# Patient Record
Sex: MALE | Race: WHITE | Employment: OTHER | URBAN - METROPOLITAN AREA
[De-identification: names, ages, dates, MRNs, and addresses within clinical notes are randomized per-mention and may not be internally consistent; named-entity substitution may affect disease eponyms.]

---

## 2020-01-21 ENCOUNTER — HOSPITAL ENCOUNTER (OUTPATIENT)
Age: 75
Discharge: HOME | End: 2020-01-23
Payer: MEDICARE

## 2020-01-21 PROCEDURE — 87186 SC STD MICRODIL/AGAR DIL: CPT

## 2020-01-21 PROCEDURE — 87205 SMEAR GRAM STAIN: CPT

## 2020-01-21 PROCEDURE — 87075 CULTR BACTERIA EXCEPT BLOOD: CPT

## 2020-01-21 PROCEDURE — 87070 CULTURE OTHR SPECIMN AEROBIC: CPT

## 2020-01-22 LAB
GRAM STAIN ORDERABLE: NORMAL

## 2020-01-23 LAB
ANAEROBIC CULTURE: NORMAL

## 2020-01-24 LAB
ORGANISM: ABNORMAL
WOUND/ABSCESS: ABNORMAL

## 2020-01-27 RX ORDER — HYDROCHLOROTHIAZIDE 25 MG/1
25 TABLET ORAL DAILY
COMMUNITY
End: 2020-03-16 | Stop reason: ALTCHOICE

## 2020-01-27 RX ORDER — ATENOLOL 100 MG/1
100 TABLET ORAL 2 TIMES DAILY
COMMUNITY

## 2020-01-27 RX ORDER — DICLOFENAC SODIUM 75 MG/1
75 TABLET, DELAYED RELEASE ORAL DAILY
COMMUNITY

## 2020-01-27 RX ORDER — FERROUS SULFATE 325(65) MG
325 TABLET ORAL
COMMUNITY

## 2020-01-27 RX ORDER — M-VIT,TX,IRON,MINS/CALC/FOLIC 27MG-0.4MG
1 TABLET ORAL DAILY
COMMUNITY
End: 2020-01-27

## 2020-01-27 RX ORDER — TAMSULOSIN HYDROCHLORIDE 0.4 MG/1
0.4 CAPSULE ORAL DAILY
COMMUNITY
End: 2020-01-27

## 2020-01-27 RX ORDER — VALACYCLOVIR HYDROCHLORIDE 1 G/1
1000 TABLET, FILM COATED ORAL 3 TIMES DAILY
COMMUNITY
End: 2020-01-27 | Stop reason: ALTCHOICE

## 2020-01-27 RX ORDER — ASPIRIN 81 MG/1
81 TABLET ORAL DAILY
COMMUNITY
End: 2020-03-16 | Stop reason: ALTCHOICE

## 2020-01-27 RX ORDER — SILDENAFIL 100 MG/1
100 TABLET, FILM COATED ORAL PRN
COMMUNITY
End: 2020-01-27

## 2020-01-27 RX ORDER — UBIDECARENONE 75 MG
100 CAPSULE ORAL DAILY
COMMUNITY

## 2020-01-27 RX ORDER — VITAMIN B COMPLEX
1 CAPSULE ORAL DAILY
COMMUNITY

## 2020-01-27 RX ORDER — LOSARTAN POTASSIUM 100 MG/1
100 TABLET ORAL DAILY
COMMUNITY
End: 2020-03-16 | Stop reason: ALTCHOICE

## 2020-01-27 RX ORDER — DOXYCYCLINE HYCLATE 100 MG/1
100 CAPSULE ORAL 2 TIMES DAILY
Status: ON HOLD | COMMUNITY
End: 2020-02-24 | Stop reason: HOSPADM

## 2020-01-27 RX ORDER — ATORVASTATIN CALCIUM 20 MG/1
20 TABLET, FILM COATED ORAL DAILY
COMMUNITY

## 2020-01-27 RX ORDER — OXCARBAZEPINE 300 MG/1
300 TABLET, FILM COATED ORAL 2 TIMES DAILY
COMMUNITY

## 2020-01-27 NOTE — PROGRESS NOTES
[facility] PRE-ADMISSION TESTING INSTRUCTIONS    The Preadmission Testing patient is instructed accordingly using the following criteria (check applicable):    ARRIVAL INSTRUCTIONS:  [x] Parking the day of Surgery is located in the Main Entrance lot. Upon entering the door, make an immediate right to the surgery reception desk    [x] Bring photo ID and insurance card    [x] Bring in a copy of Living will or Durable Power of  papers. [x] Please be sure to arrange for responsible adult to provide transportation to and from the hospital    [x] Please arrange for responsible adult to be with you for the 24 hour period post procedure due to having anesthesia      GENERAL INSTRUCTIONS:    [x] Nothing by mouth after midnight, including gum, candy, mints or water    [x] You may brush your teeth, but do not swallow any water    [x] Take medications as instructed with 1-2 oz of water    [x] Stop herbal supplements and vitamins 5 days prior to procedure    [x] Follow preop dosing of blood thinners per physician instructions    [] Take 1/2 dose of evening insulin, but no insulin after midnight    [x] No oral diabetic medications after midnight    [] If diabetic and have low blood sugar or feel symptomatic, take 1-2oz apple juice only    [] Bring inhalers day of surgery    [] Bring C-PAP/ Bi-Pap day of surgery    [] Bring urine specimen day of surgery    [x] Shower or bath with soap, lather and rinse well, AM of Surgery, no lotion, powders or creams to surgical site    [] Follow bowel prep as instructed per surgeon    [x] No tobacco products within 24 hours of surgery     [x] No alcohol or illegal drug use within 24 hours of surgery.     [x] Jewelry, body piercing's, eyeglasses, contact lenses and dentures are not permitted into surgery (bring cases)      [x] Please do not wear any nail polish, make up or hair products on the day of surgery    [x] If not already done, you can expect a call from registration    [x] You can expect a call the business day prior to procedure to notify you if your arrival time changes    [x] If you receive a survey after surgery we would greatly appreciate your comments    [] Parent/guardian of a minor must accompany their child and remain on the premises  the entire time they are under our care     [] Pediatric patients may bring favorite toy, blanket or comfort item with them    [] A caregiver or family member must remain with the patient during their stay if they are mentally handicapped, have dementia, disoriented or unable to use a call light or would be a safety concern if left unattended    [x] Please notify surgeon if you develop any illness between now and time of surgery (cold, cough, sore throat, fever, nausea, vomiting) or any signs of infections  including skin, wounds, and dental.    [x]  The Outpatient Pharmacy is available to fill your prescription here on your day of surgery, ask your preop nurse for details    [] Other instructions  EDUCATIONAL MATERIALS PROVIDED:    [] PAT Preoperative Education Packet/Booklet     [] Medication List    [] Fluoroscopy Information Pamphlet    [] Transfusion bracelet applied with instructions    [] Joint replacement video reviewed    [] Shower with soap, lather and rinse well, and use CHG wipes provided the evening before surgery as instructed

## 2020-02-05 ENCOUNTER — ANESTHESIA EVENT (OUTPATIENT)
Dept: OPERATING ROOM | Age: 75
End: 2020-02-05
Payer: MEDICARE

## 2020-02-06 ENCOUNTER — HOSPITAL ENCOUNTER (OUTPATIENT)
Age: 75
Setting detail: OUTPATIENT SURGERY
Discharge: HOME | End: 2020-02-06
Attending: PODIATRIST | Admitting: PODIATRIST
Payer: MEDICARE

## 2020-02-06 ENCOUNTER — ANESTHESIA (OUTPATIENT)
Dept: OPERATING ROOM | Age: 75
End: 2020-02-06
Payer: MEDICARE

## 2020-02-06 VITALS
SYSTOLIC BLOOD PRESSURE: 188 MMHG | HEART RATE: 60 BPM | WEIGHT: 277 LBS | RESPIRATION RATE: 16 BRPM | TEMPERATURE: 96 F | DIASTOLIC BLOOD PRESSURE: 82 MMHG | OXYGEN SATURATION: 94 % | BODY MASS INDEX: 41.98 KG/M2 | HEIGHT: 68 IN

## 2020-02-06 VITALS — OXYGEN SATURATION: 99 % | SYSTOLIC BLOOD PRESSURE: 137 MMHG | TEMPERATURE: 92.8 F | DIASTOLIC BLOOD PRESSURE: 57 MMHG

## 2020-02-06 LAB
METER GLUCOSE: 133 MG/DL (ref 74–99)
METER GLUCOSE: 137 MG/DL (ref 74–99)

## 2020-02-06 PROCEDURE — 7100000010 HC PHASE II RECOVERY - FIRST 15 MIN: Performed by: PODIATRIST

## 2020-02-06 PROCEDURE — 2580000003 HC RX 258: Performed by: NURSE ANESTHETIST, CERTIFIED REGISTERED

## 2020-02-06 PROCEDURE — 87075 CULTR BACTERIA EXCEPT BLOOD: CPT

## 2020-02-06 PROCEDURE — 2709999900 HC NON-CHARGEABLE SUPPLY: Performed by: PODIATRIST

## 2020-02-06 PROCEDURE — 87206 SMEAR FLUORESCENT/ACID STAI: CPT

## 2020-02-06 PROCEDURE — 3700000000 HC ANESTHESIA ATTENDED CARE: Performed by: PODIATRIST

## 2020-02-06 PROCEDURE — 88311 DECALCIFY TISSUE: CPT

## 2020-02-06 PROCEDURE — 87102 FUNGUS ISOLATION CULTURE: CPT

## 2020-02-06 PROCEDURE — 87205 SMEAR GRAM STAIN: CPT

## 2020-02-06 PROCEDURE — 7100000011 HC PHASE II RECOVERY - ADDTL 15 MIN: Performed by: PODIATRIST

## 2020-02-06 PROCEDURE — 6360000002 HC RX W HCPCS: Performed by: NURSE ANESTHETIST, CERTIFIED REGISTERED

## 2020-02-06 PROCEDURE — 87015 SPECIMEN INFECT AGNT CONCNTJ: CPT

## 2020-02-06 PROCEDURE — 3600000002 HC SURGERY LEVEL 2 BASE: Performed by: PODIATRIST

## 2020-02-06 PROCEDURE — 2580000003 HC RX 258: Performed by: PODIATRIST

## 2020-02-06 PROCEDURE — 87116 MYCOBACTERIA CULTURE: CPT

## 2020-02-06 PROCEDURE — 2500000003 HC RX 250 WO HCPCS: Performed by: NURSE ANESTHETIST, CERTIFIED REGISTERED

## 2020-02-06 PROCEDURE — 82962 GLUCOSE BLOOD TEST: CPT

## 2020-02-06 PROCEDURE — 3700000001 HC ADD 15 MINUTES (ANESTHESIA): Performed by: PODIATRIST

## 2020-02-06 PROCEDURE — 6360000002 HC RX W HCPCS: Performed by: PODIATRIST

## 2020-02-06 PROCEDURE — 3600000012 HC SURGERY LEVEL 2 ADDTL 15MIN: Performed by: PODIATRIST

## 2020-02-06 PROCEDURE — 87070 CULTURE OTHR SPECIMN AEROBIC: CPT

## 2020-02-06 PROCEDURE — 88304 TISSUE EXAM BY PATHOLOGIST: CPT

## 2020-02-06 RX ORDER — FENTANYL CITRATE 50 UG/ML
25 INJECTION, SOLUTION INTRAMUSCULAR; INTRAVENOUS EVERY 5 MIN PRN
Status: DISCONTINUED | OUTPATIENT
Start: 2020-02-06 | End: 2020-02-06 | Stop reason: HOSPADM

## 2020-02-06 RX ORDER — FENTANYL CITRATE 50 UG/ML
INJECTION, SOLUTION INTRAMUSCULAR; INTRAVENOUS PRN
Status: DISCONTINUED | OUTPATIENT
Start: 2020-02-06 | End: 2020-02-06 | Stop reason: SDUPTHER

## 2020-02-06 RX ORDER — PROPOFOL 10 MG/ML
INJECTION, EMULSION INTRAVENOUS CONTINUOUS PRN
Status: DISCONTINUED | OUTPATIENT
Start: 2020-02-06 | End: 2020-02-06 | Stop reason: SDUPTHER

## 2020-02-06 RX ORDER — DOXYCYCLINE HYCLATE 100 MG
100 TABLET ORAL 2 TIMES DAILY
Qty: 28 TABLET | Refills: 0 | Status: SHIPPED | OUTPATIENT
Start: 2020-02-06 | End: 2020-02-20 | Stop reason: ALTCHOICE

## 2020-02-06 RX ORDER — ONDANSETRON 2 MG/ML
INJECTION INTRAMUSCULAR; INTRAVENOUS PRN
Status: DISCONTINUED | OUTPATIENT
Start: 2020-02-06 | End: 2020-02-06 | Stop reason: SDUPTHER

## 2020-02-06 RX ORDER — GLYCOPYRROLATE 1 MG/5 ML
SYRINGE (ML) INTRAVENOUS PRN
Status: DISCONTINUED | OUTPATIENT
Start: 2020-02-06 | End: 2020-02-06 | Stop reason: SDUPTHER

## 2020-02-06 RX ORDER — SODIUM CHLORIDE 9 MG/ML
INJECTION, SOLUTION INTRAVENOUS CONTINUOUS PRN
Status: DISCONTINUED | OUTPATIENT
Start: 2020-02-06 | End: 2020-02-06 | Stop reason: SDUPTHER

## 2020-02-06 RX ADMIN — SODIUM CHLORIDE: 9 INJECTION, SOLUTION INTRAVENOUS at 07:04

## 2020-02-06 RX ADMIN — Medication 0.2 MG: at 07:11

## 2020-02-06 RX ADMIN — FENTANYL CITRATE 50 MCG: 50 INJECTION, SOLUTION INTRAMUSCULAR; INTRAVENOUS at 07:11

## 2020-02-06 RX ADMIN — DEXTROSE MONOHYDRATE 3 G: 50 INJECTION, SOLUTION INTRAVENOUS at 07:04

## 2020-02-06 RX ADMIN — PROPOFOL 80 MCG/KG/MIN: 10 INJECTION, EMULSION INTRAVENOUS at 07:11

## 2020-02-06 RX ADMIN — Medication 0.2 MG: at 07:19

## 2020-02-06 RX ADMIN — ONDANSETRON HYDROCHLORIDE 4 MG: 2 INJECTION, SOLUTION INTRAMUSCULAR; INTRAVENOUS at 07:11

## 2020-02-06 ASSESSMENT — PAIN SCALES - GENERAL
PAINLEVEL_OUTOF10: 0

## 2020-02-06 ASSESSMENT — PAIN - FUNCTIONAL ASSESSMENT: PAIN_FUNCTIONAL_ASSESSMENT: 0-10

## 2020-02-06 NOTE — ANESTHESIA POSTPROCEDURE EVALUATION
Department of Anesthesiology  Postprocedure Note    Patient: Yakov Singh  MRN: [id number]  YOB: 1945  Date of evaluation: 2/6/2020  Time:  2:17 PM     Procedure Summary     Date:  02/06/20 Room / Location:  [facility] OR 07 / [facility]    Anesthesia Start:  6729 Anesthesia Stop:  8558    Procedure:  INCISION AND DRAINAGE WITH BONE DEBRIDEMENT X3 TOES 2 AND 5 ON THE RIGHT THIRD TOE ON THE LEFT (Bilateral Foot) Diagnosis:  (OSTEOMYELITIS)    Surgeon:  Daisy Villavicencio DPM Responsible Provider:  Arlyn Melendez MD    Anesthesia Type:  MAC ASA Status:  3          Anesthesia Type: MAC    Valentino Phase I: Valentino Score: 10    Valentino Phase II: Valentino Score: 10    Last vitals: Reviewed and per EMR flowsheets.        Anesthesia Post Evaluation    Patient location during evaluation: PACU  Level of consciousness: awake  Airway patency: patent  Nausea & Vomiting: no nausea and no vomiting  Complications: no  Cardiovascular status: hemodynamically stable  Respiratory status: acceptable  Hydration status: euvolemic Prescription refilled

## 2020-02-06 NOTE — ANESTHESIA PRE PROCEDURE
Department of Anesthesiology  Preprocedure Note       Name:  Ita Bermudez   Age:  75 y.o.  :  1945                                          MRN:  [id number]         Date:  2020      Surgeon: Alexa Irving):  Alma Delia Webster DPM    Procedure: INCISION AND DRAINAGE WITH BONE DEBRIDEMENT X3 TOES 2 AND 5 ON THE RIGHT THIRD TOE ON THE LEFT (Bilateral )    Medications prior to admission:   Prior to Admission medications    Medication Sig Start Date End Date Taking?  Authorizing Provider   OXcarbazepine (TRILEPTAL) 300 MG tablet Take 300 mg by mouth 2 times daily Take am dos    Yes Historical Provider, MD   diclofenac (VOLTAREN) 75 MG EC tablet Take 75 mg by mouth daily Contact Dr Rogelio Loaiza re: preop instructions   Yes Historical Provider, MD   hydrochlorothiazide (HYDRODIURIL) 25 MG tablet Take 25 mg by mouth daily   Yes Historical Provider, MD   losartan (COZAAR) 100 MG tablet Take 100 mg by mouth daily   Yes Historical Provider, MD   ferrous sulfate 325 (65 Fe) MG tablet Take 325 mg by mouth daily (with breakfast) Ld    Yes Historical Provider, MD   vitamin D 25 MCG (1000 UT) CAPS Take 1 capsule by mouth daily Ld    Yes Historical Provider, MD   atorvastatin (LIPITOR) 20 MG tablet Take 20 mg by mouth daily   Yes Historical Provider, MD   atenolol (TENORMIN) 100 MG tablet Take 100 mg by mouth daily Take am dos    Yes Historical Provider, MD   aspirin EC 81 MG EC tablet Take 81 mg by mouth daily Contact Dr Rogelio Loaiza re: preop instructions   Yes Historical Provider, MD   vitamin B-12 (CYANOCOBALAMIN) 100 MCG tablet Take 100 mcg by mouth daily Ld    Yes Historical Provider, MD   b complex vitamins capsule Take 1 capsule by mouth daily Ld    Yes Historical Provider, MD   metFORMIN (GLUCOPHAGE) 850 MG tablet Take 850 mg by mouth daily (with breakfast)   Yes Historical Provider, MD   doxycycline hyclate (VIBRAMYCIN) 100 MG capsule Take 100 mg by mouth 2 times daily   Yes Historical Provider, MD       Current medications:    No current facility-administered medications for this encounter. Current Outpatient Medications   Medication Sig Dispense Refill    OXcarbazepine (TRILEPTAL) 300 MG tablet Take 300 mg by mouth 2 times daily Take am dos 02/06      diclofenac (VOLTAREN) 75 MG EC tablet Take 75 mg by mouth daily Contact Dr Say Richardson re: preop instructions      hydrochlorothiazide (HYDRODIURIL) 25 MG tablet Take 25 mg by mouth daily      losartan (COZAAR) 100 MG tablet Take 100 mg by mouth daily      ferrous sulfate 325 (65 Fe) MG tablet Take 325 mg by mouth daily (with breakfast) Ld 01/31      vitamin D 25 MCG (1000 UT) CAPS Take 1 capsule by mouth daily Ld 01/31      atorvastatin (LIPITOR) 20 MG tablet Take 20 mg by mouth daily      atenolol (TENORMIN) 100 MG tablet Take 100 mg by mouth daily Take am dos 02/06      aspirin EC 81 MG EC tablet Take 81 mg by mouth daily Contact Dr Say Richardson re: preop instructions      vitamin B-12 (CYANOCOBALAMIN) 100 MCG tablet Take 100 mcg by mouth daily Ld 01/31      b complex vitamins capsule Take 1 capsule by mouth daily Ld 01/31      metFORMIN (GLUCOPHAGE) 850 MG tablet Take 850 mg by mouth daily (with breakfast)      doxycycline hyclate (VIBRAMYCIN) 100 MG capsule Take 100 mg by mouth 2 times daily         Allergies: Allergies   Allergen Reactions    Lisinopril      Per PCP OV; Pt denies       Problem List:  There is no problem list on file for this patient.       Past Medical History:        Diagnosis Date    Diabetes mellitus ([address]     DJD (degenerative joint disease)     ED (erectile dysfunction)     History of kidney stones     Hyperlipidemia     Hypertension     Hyperthyroidism     Seizure ([address]     right hand will shake uncontrollably ; last seizure 09/2019    Wears glasses     reading    Wears hearing aid     bilateral       Past Surgical History:        Procedure Laterality Date    COLONOSCOPY         Social History:    Social History     Tobacco Use    Smoking status: Former Smoker     Packs/day: 0.25     Years: 14.00     Pack years: 3.50     Types: Cigarettes    Smokeless tobacco: Never Used   Substance Use Topics    Alcohol use: Yes     Comment: 2-3 beers per year                                Counseling given: Not Answered      Vital Signs (Current):   Vitals:    01/27/20 1511   Weight: 277 lb (125.6 kg)   Height: 5' 8\" (1.727 m)                                              BP Readings from Last 3 Encounters:   No data found for BP       NPO Status:                                                                                 BMI:   Wt Readings from Last 3 Encounters:   No data found for Wt     Body mass index is 42.12 kg/m². CBC: No results found for: WBC, RBC, HGB, HCT, MCV, RDW, PLT    CMP: No results found for: NA, K, CL, CO2, BUN, CREATININE, GFRAA, AGRATIO, LABGLOM, GLUCOSE, PROT, CALCIUM, BILITOT, ALKPHOS, AST, ALT    POC Tests: No results for input(s): POCGLU, POCNA, POCK, POCCL, POCBUN, POCHEMO, POCHCT in the last 72 hours. Coags: No results found for: PROTIME, INR, APTT    HCG (If Applicable): No results found for: PREGTESTUR, PREGSERUM, HCG, HCGQUANT     ABGs: No results found for: PHART, PO2ART, AZE0BCW, YXZ3HSW, BEART, Z1WUGYCI     Type & Screen (If Applicable):  No results found for: LABABO, [address]    Anesthesia Evaluation  Patient summary reviewed no history of anesthetic complications:   Airway: Mallampati: III  TM distance: >3 FB   Neck ROM: limited  Mouth opening: > = 3 FB Dental:      Comment: Wears partials--removed.     Pulmonary:Negative Pulmonary ROS breath sounds clear to auscultation                             Cardiovascular:    (+) hypertension:,         Rhythm: regular  Rate: normal                    Neuro/Psych:   (+) seizures:,             GI/Hepatic/Renal:   (+) renal disease: kidney stones,           Endo/Other:    (+) DiabetesType II DM, , .                 Abdominal: Vascular: negative vascular ROS. Anesthesia Plan      MAC     ASA 3     (Pt agrees to [facility] ATHENS and IV sedation. He consents to GA if necessary.)  Induction: intravenous. Anesthetic plan and risks discussed with patient. Plan discussed with CRNA. Attending anesthesiologist reviewed and agrees with Pre Eval content        DOS STAFF ADDENDUM:    Pt seen and examined, physical exam updated, chart reviewed including anesthesia, drug and allergy history. H&P reviewed. No interval changes to history or physical examination (unless noted above). NPO status confirmed. Anesthetic plan, risks, benefits, alternatives discussed with patient. Patient verbalized an understanding and agrees to proceed.      Cheyenne Ramirez MD  Staff Anesthesiologist  7:00 AM        Cheyenne Ramirez MD   2/5/2020

## 2020-02-07 LAB
GRAM STAIN ORDERABLE: NORMAL

## 2020-02-08 LAB
CULTURE SURGICAL: NORMAL

## 2020-02-10 NOTE — OP NOTE
[address] [address]                                OPERATIVE REPORT    PATIENT NAME: Hamilton Farnsworth                    :        1945  MED REC NO:   [id number]                            ROOM:  ACCOUNT NO:   [de-identified]                           ADMIT DATE: 2020  PROVIDER:     Danna Guardian, DPM    DATE OF PROCEDURE:  2020    INDICATION:  The patient is a 14-year-old white male who was seen for  chronic osteomyelitis and ulcerations of his toes. With this in mind,  he is set up for surgery today at [facility] for incision and  drainage, bone debridement, and bone biopsy obtaining cultures and  biopsies of the toes to confirm if he has osteomyelitis or not of his  third toe, left, and second and fifth toes on his right. With this in  mind, he is aware of the pros, cons, risks, and benefits of  overcorrection, undercorrection, recurrence, infection, limb loss, DVT,  PE, toe loss, and that anything can happen, nothing should happen. With  this in mind, he understands the pros, cons, risks, and benefits of the  surgery. Today, he is set up for surgery for incision and drainage,  bone debridement, and bone biopsy of the third toe, left, and second and  fifth toes of the right foot. With this in mind, he agreed to the  consent, site marking, and perioperative management. LOWER EXTREMITY PHYSICAL EXAMINATION:  VASCULAR:  He has intact pedal pulses and good perfusion to his right  and left lower extremities. NEUROLOGIC:  He has loss of protective sensation secondary to diabetic  peripheral neuropathy. DERMATOLOGIC:  He has open wounds on his third toe, left, and his second  toe, right. His third toe, left, measures 0.5 x 0.5 at the distal tip  of his third toe. There are no cardinal signs of infection, drainage,  or undermining.   There is tunneling to the bone of the third toe of the  left secondary to flexion contracture and hammertoe deformity. On the  second toe of his right, this wound measures 0.6 x 0.6. It is full  thickness in nature, distal tip of the second toe, secondary to flexion  contracture. There are no chronic signs of infection, drainage, or  undermining. There is tunneling directly to the distal tip of the bone. On his fifth toe at the PIPJ dorsally secondary to flexion contracture,  he has an open wound that measures 0.8 x 0.8. It is full thickness in  nature. There are no cardinal signs of infection, drainage,  undermining, or tunneling. There is bone exposed in this area secondary  to flexion contracture of his fifth toe at the PIPJ. All these wounds  again have previously exhibited signs of infection, but clinically at  this time they are clean and treated clinical infection; however,  concerned that there is long-term osteomyelitis. MUSCULOSKELETAL:  He has flexion contractures of the digits 1 through 5  bilaterally secondary to diabetic peripheral neuropathy. He has a tight  posterior muscle group bilaterally. ORTHOPEDIC ASSESSMENT:  He has concerns about osteomyelitis of the  second toe, right; fifth toe, right; third toe, left. SURGEON:  Miguel Adkins DPM.    ASSISTANTS:  1.  Dr. Monica Caruso, Fellow. 2.  Dr. Anais Crawford, PGY-3.    PREOPERATIVE DIAGNOSES:  1.  Osteomyelitis of the third toe, left. 2.  Osteomyelitis of the second toe, right. 3.  Osteomyelitis of the fifth toe, right. POSTOPERATIVE DIAGNOSES:  1.  Osteomyelitis of the third toe, left. 2.  Osteomyelitis of the second toe, right. 3.  Osteomyelitis of the fifth toe, right. PROCEDURE PERFORMED:  1. Incision and drainage and bone debridement of the third toe, left. 2.  Incision and drainage and bone debridement of left second toe,  right. 3.  Incision and drainage and bone debridement of left fifth toe, right.     DESCRIPTION OF PROCEDURE:  The patient was seen in the

## 2020-02-11 LAB
ANAEROBIC CULTURE: NORMAL

## 2020-02-23 ENCOUNTER — ANESTHESIA EVENT (OUTPATIENT)
Dept: OPERATING ROOM | Age: 75
End: 2020-02-23
Payer: MEDICARE

## 2020-02-24 ENCOUNTER — ANESTHESIA (OUTPATIENT)
Dept: OPERATING ROOM | Age: 75
End: 2020-02-24
Payer: MEDICARE

## 2020-02-24 ENCOUNTER — HOSPITAL ENCOUNTER (OUTPATIENT)
Dept: GENERAL RADIOLOGY | Age: 75
Setting detail: OUTPATIENT SURGERY
Discharge: HOME | End: 2020-02-26
Attending: PODIATRIST
Payer: MEDICARE

## 2020-02-24 ENCOUNTER — HOSPITAL ENCOUNTER (OUTPATIENT)
Age: 75
Setting detail: OUTPATIENT SURGERY
Discharge: HOME | End: 2020-02-24
Attending: PODIATRIST | Admitting: PODIATRIST
Payer: MEDICARE

## 2020-02-24 VITALS
WEIGHT: 281 LBS | HEART RATE: 54 BPM | BODY MASS INDEX: 42.59 KG/M2 | HEIGHT: 68 IN | SYSTOLIC BLOOD PRESSURE: 146 MMHG | DIASTOLIC BLOOD PRESSURE: 80 MMHG | TEMPERATURE: 97.2 F | RESPIRATION RATE: 14 BRPM | OXYGEN SATURATION: 93 %

## 2020-02-24 VITALS — DIASTOLIC BLOOD PRESSURE: 92 MMHG | OXYGEN SATURATION: 100 % | SYSTOLIC BLOOD PRESSURE: 150 MMHG | TEMPERATURE: 81 F

## 2020-02-24 LAB
ANION GAP SERPL CALCULATED.3IONS-SCNC: 12 MMOL/L (ref 7–16)
BUN BLDV-MCNC: 19 MG/DL (ref 8–23)
CALCIUM SERPL-MCNC: 9.3 MG/DL (ref 8.6–10.2)
CHLORIDE BLD-SCNC: 100 MMOL/L (ref 98–107)
CO2: 29 MMOL/L (ref 22–29)
CREAT SERPL-MCNC: 1 MG/DL (ref 0.7–1.2)
GFR AFRICAN AMERICAN: >60
GFR NON-AFRICAN AMERICAN: >60 ML/MIN/1.73
GLUCOSE BLD-MCNC: 120 MG/DL (ref 74–99)
HCT VFR BLD CALC: 39.8 % (ref 37–54)
HEMOGLOBIN: 12.8 G/DL (ref 12.5–16.5)
MCH RBC QN AUTO: 30.4 PG (ref 26–35)
MCHC RBC AUTO-ENTMCNC: 32.2 % (ref 32–34.5)
MCV RBC AUTO: 94.5 FL (ref 80–99.9)
PDW BLD-RTO: 13.5 FL (ref 11.5–15)
PLATELET # BLD: 244 E9/L (ref 130–450)
PMV BLD AUTO: 9.8 FL (ref 7–12)
POTASSIUM SERPL-SCNC: 4.5 MMOL/L (ref 3.5–5)
RBC # BLD: 4.21 E12/L (ref 3.8–5.8)
SODIUM BLD-SCNC: 141 MMOL/L (ref 132–146)
WBC # BLD: 8.1 E9/L (ref 4.5–11.5)

## 2020-02-24 PROCEDURE — 2709999900 HC NON-CHARGEABLE SUPPLY: Performed by: PODIATRIST

## 2020-02-24 PROCEDURE — C1713 ANCHOR/SCREW BN/BN,TIS/BN: HCPCS | Performed by: PODIATRIST

## 2020-02-24 PROCEDURE — 7100000010 HC PHASE II RECOVERY - FIRST 15 MIN: Performed by: PODIATRIST

## 2020-02-24 PROCEDURE — 36415 COLL VENOUS BLD VENIPUNCTURE: CPT

## 2020-02-24 PROCEDURE — 2500000003 HC RX 250 WO HCPCS

## 2020-02-24 PROCEDURE — 3700000001 HC ADD 15 MINUTES (ANESTHESIA): Performed by: PODIATRIST

## 2020-02-24 PROCEDURE — 6360000002 HC RX W HCPCS

## 2020-02-24 PROCEDURE — 3600000012 HC SURGERY LEVEL 2 ADDTL 15MIN: Performed by: PODIATRIST

## 2020-02-24 PROCEDURE — 7100000000 HC PACU RECOVERY - FIRST 15 MIN: Performed by: PODIATRIST

## 2020-02-24 PROCEDURE — 88304 TISSUE EXAM BY PATHOLOGIST: CPT

## 2020-02-24 PROCEDURE — 6360000002 HC RX W HCPCS: Performed by: PODIATRIST

## 2020-02-24 PROCEDURE — 3209999900 FLUORO FOR SURGICAL PROCEDURES

## 2020-02-24 PROCEDURE — 2580000003 HC RX 258: Performed by: PODIATRIST

## 2020-02-24 PROCEDURE — 7100000001 HC PACU RECOVERY - ADDTL 15 MIN: Performed by: PODIATRIST

## 2020-02-24 PROCEDURE — 3700000000 HC ANESTHESIA ATTENDED CARE: Performed by: PODIATRIST

## 2020-02-24 PROCEDURE — 2580000003 HC RX 258

## 2020-02-24 PROCEDURE — 85027 COMPLETE CBC AUTOMATED: CPT

## 2020-02-24 PROCEDURE — 2500000003 HC RX 250 WO HCPCS: Performed by: PODIATRIST

## 2020-02-24 PROCEDURE — 3600000002 HC SURGERY LEVEL 2 BASE: Performed by: PODIATRIST

## 2020-02-24 PROCEDURE — 80048 BASIC METABOLIC PNL TOTAL CA: CPT

## 2020-02-24 PROCEDURE — 7100000011 HC PHASE II RECOVERY - ADDTL 15 MIN: Performed by: PODIATRIST

## 2020-02-24 DEVICE — IMPLANTABLE DEVICE: Type: IMPLANTABLE DEVICE | Site: TOES | Status: FUNCTIONAL

## 2020-02-24 RX ORDER — ROCURONIUM BROMIDE 10 MG/ML
INJECTION, SOLUTION INTRAVENOUS PRN
Status: DISCONTINUED | OUTPATIENT
Start: 2020-02-24 | End: 2020-02-24 | Stop reason: SDUPTHER

## 2020-02-24 RX ORDER — CEFAZOLIN SODIUM 2 G/50ML
2 SOLUTION INTRAVENOUS
Status: DISCONTINUED | OUTPATIENT
Start: 2020-02-24 | End: 2020-02-24 | Stop reason: DRUGHIGH

## 2020-02-24 RX ORDER — MEPERIDINE HYDROCHLORIDE 25 MG/ML
12.5 INJECTION INTRAMUSCULAR; INTRAVENOUS; SUBCUTANEOUS EVERY 5 MIN PRN
Status: DISCONTINUED | OUTPATIENT
Start: 2020-02-24 | End: 2020-02-24 | Stop reason: HOSPADM

## 2020-02-24 RX ORDER — MIDAZOLAM HYDROCHLORIDE 1 MG/ML
INJECTION INTRAMUSCULAR; INTRAVENOUS PRN
Status: DISCONTINUED | OUTPATIENT
Start: 2020-02-24 | End: 2020-02-24 | Stop reason: SDUPTHER

## 2020-02-24 RX ORDER — CEFAZOLIN SODIUM 2 G/50ML
SOLUTION INTRAVENOUS
Status: DISCONTINUED
Start: 2020-02-24 | End: 2020-02-24 | Stop reason: WASHOUT

## 2020-02-24 RX ORDER — BUPIVACAINE HYDROCHLORIDE 5 MG/ML
INJECTION, SOLUTION EPIDURAL; INTRACAUDAL PRN
Status: DISCONTINUED | OUTPATIENT
Start: 2020-02-24 | End: 2020-02-24 | Stop reason: ALTCHOICE

## 2020-02-24 RX ORDER — LABETALOL HYDROCHLORIDE 5 MG/ML
5 INJECTION, SOLUTION INTRAVENOUS EVERY 10 MIN PRN
Status: DISCONTINUED | OUTPATIENT
Start: 2020-02-24 | End: 2020-02-24 | Stop reason: HOSPADM

## 2020-02-24 RX ORDER — GLYCOPYRROLATE 0.2 MG/ML
INJECTION INTRAMUSCULAR; INTRAVENOUS PRN
Status: DISCONTINUED | OUTPATIENT
Start: 2020-02-24 | End: 2020-02-24 | Stop reason: SDUPTHER

## 2020-02-24 RX ORDER — PROPOFOL 10 MG/ML
INJECTION, EMULSION INTRAVENOUS PRN
Status: DISCONTINUED | OUTPATIENT
Start: 2020-02-24 | End: 2020-02-24 | Stop reason: SDUPTHER

## 2020-02-24 RX ORDER — HYDRALAZINE HYDROCHLORIDE 20 MG/ML
5 INJECTION INTRAMUSCULAR; INTRAVENOUS EVERY 10 MIN PRN
Status: DISCONTINUED | OUTPATIENT
Start: 2020-02-24 | End: 2020-02-24 | Stop reason: HOSPADM

## 2020-02-24 RX ORDER — EPHEDRINE SULFATE/0.9% NACL/PF 50 MG/5 ML
SYRINGE (ML) INTRAVENOUS PRN
Status: DISCONTINUED | OUTPATIENT
Start: 2020-02-24 | End: 2020-02-24 | Stop reason: SDUPTHER

## 2020-02-24 RX ORDER — SODIUM CHLORIDE 9 MG/ML
INJECTION, SOLUTION INTRAVENOUS CONTINUOUS PRN
Status: DISCONTINUED | OUTPATIENT
Start: 2020-02-24 | End: 2020-02-24 | Stop reason: SDUPTHER

## 2020-02-24 RX ORDER — TRAMADOL HYDROCHLORIDE 50 MG/1
50 TABLET ORAL EVERY 6 HOURS PRN
Qty: 28 TABLET | Refills: 0 | Status: SHIPPED | OUTPATIENT
Start: 2020-02-24 | End: 2020-03-02

## 2020-02-24 RX ORDER — ONDANSETRON 2 MG/ML
INJECTION INTRAMUSCULAR; INTRAVENOUS PRN
Status: DISCONTINUED | OUTPATIENT
Start: 2020-02-24 | End: 2020-02-24 | Stop reason: SDUPTHER

## 2020-02-24 RX ORDER — DEXAMETHASONE SODIUM PHOSPHATE 4 MG/ML
INJECTION, SOLUTION INTRA-ARTICULAR; INTRALESIONAL; INTRAMUSCULAR; INTRAVENOUS; SOFT TISSUE PRN
Status: DISCONTINUED | OUTPATIENT
Start: 2020-02-24 | End: 2020-02-24 | Stop reason: SDUPTHER

## 2020-02-24 RX ORDER — PROMETHAZINE HYDROCHLORIDE 25 MG/ML
6.25 INJECTION, SOLUTION INTRAMUSCULAR; INTRAVENOUS
Status: DISCONTINUED | OUTPATIENT
Start: 2020-02-24 | End: 2020-02-24 | Stop reason: HOSPADM

## 2020-02-24 RX ORDER — KETOROLAC TROMETHAMINE 30 MG/ML
INJECTION, SOLUTION INTRAMUSCULAR; INTRAVENOUS PRN
Status: DISCONTINUED | OUTPATIENT
Start: 2020-02-24 | End: 2020-02-24 | Stop reason: SDUPTHER

## 2020-02-24 RX ORDER — LIDOCAINE HYDROCHLORIDE 20 MG/ML
INJECTION, SOLUTION EPIDURAL; INFILTRATION; INTRACAUDAL; PERINEURAL PRN
Status: DISCONTINUED | OUTPATIENT
Start: 2020-02-24 | End: 2020-02-24 | Stop reason: SDUPTHER

## 2020-02-24 RX ORDER — NEOSTIGMINE METHYLSULFATE 1 MG/ML
INJECTION, SOLUTION INTRAVENOUS PRN
Status: DISCONTINUED | OUTPATIENT
Start: 2020-02-24 | End: 2020-02-24 | Stop reason: SDUPTHER

## 2020-02-24 RX ORDER — FENTANYL CITRATE 50 UG/ML
INJECTION, SOLUTION INTRAMUSCULAR; INTRAVENOUS PRN
Status: DISCONTINUED | OUTPATIENT
Start: 2020-02-24 | End: 2020-02-24 | Stop reason: SDUPTHER

## 2020-02-24 RX ORDER — DOXYCYCLINE HYCLATE 100 MG
100 TABLET ORAL 2 TIMES DAILY
Qty: 28 TABLET | Refills: 0 | Status: SHIPPED | OUTPATIENT
Start: 2020-02-24 | End: 2020-03-09

## 2020-02-24 RX ADMIN — GLYCOPYRROLATE 0.4 MG: 0.2 INJECTION INTRAMUSCULAR; INTRAVENOUS at 13:55

## 2020-02-24 RX ADMIN — PROPOFOL 150 MG: 10 INJECTION, EMULSION INTRAVENOUS at 13:40

## 2020-02-24 RX ADMIN — KETOROLAC TROMETHAMINE 30 MG: 30 INJECTION, SOLUTION INTRAMUSCULAR; INTRAVENOUS at 14:23

## 2020-02-24 RX ADMIN — ONDANSETRON HYDROCHLORIDE 4 MG: 2 INJECTION, SOLUTION INTRAMUSCULAR; INTRAVENOUS at 14:40

## 2020-02-24 RX ADMIN — SUGAMMADEX 500 MG: 100 INJECTION, SOLUTION INTRAVENOUS at 14:57

## 2020-02-24 RX ADMIN — Medication 3 MG: at 14:40

## 2020-02-24 RX ADMIN — MIDAZOLAM 2 MG: 1 INJECTION INTRAMUSCULAR; INTRAVENOUS at 13:32

## 2020-02-24 RX ADMIN — ROCURONIUM BROMIDE 50 MG: 10 SOLUTION INTRAVENOUS at 13:40

## 2020-02-24 RX ADMIN — Medication 5 MG: at 14:12

## 2020-02-24 RX ADMIN — GLYCOPYRROLATE 0.6 MG: 0.2 INJECTION INTRAMUSCULAR; INTRAVENOUS at 14:40

## 2020-02-24 RX ADMIN — SODIUM CHLORIDE: 9 INJECTION, SOLUTION INTRAVENOUS at 13:40

## 2020-02-24 RX ADMIN — PHENYLEPHRINE HYDROCHLORIDE 100 MCG: 10 INJECTION INTRAVENOUS at 14:12

## 2020-02-24 RX ADMIN — DEXAMETHASONE SODIUM PHOSPHATE 10 MG: 4 INJECTION, SOLUTION INTRAMUSCULAR; INTRAVENOUS at 13:40

## 2020-02-24 RX ADMIN — LIDOCAINE HYDROCHLORIDE 100 MG: 20 INJECTION, SOLUTION EPIDURAL; INFILTRATION; INTRACAUDAL; PERINEURAL at 13:40

## 2020-02-24 RX ADMIN — PHENYLEPHRINE HYDROCHLORIDE 100 MCG: 10 INJECTION INTRAVENOUS at 14:07

## 2020-02-24 RX ADMIN — Medication 5 MG: at 13:56

## 2020-02-24 RX ADMIN — FENTANYL CITRATE 100 MCG: 50 INJECTION, SOLUTION INTRAMUSCULAR; INTRAVENOUS at 13:40

## 2020-02-24 RX ADMIN — DEXTROSE MONOHYDRATE 3 G: 50 INJECTION, SOLUTION INTRAVENOUS at 13:40

## 2020-02-24 ASSESSMENT — PAIN SCALES - GENERAL
PAINLEVEL_OUTOF10: 0

## 2020-02-24 ASSESSMENT — PULMONARY FUNCTION TESTS
PIF_VALUE: 25
PIF_VALUE: 26
PIF_VALUE: 7
PIF_VALUE: 2
PIF_VALUE: 24
PIF_VALUE: 2
PIF_VALUE: 2
PIF_VALUE: 26
PIF_VALUE: 26
PIF_VALUE: 18
PIF_VALUE: 25
PIF_VALUE: 25
PIF_VALUE: 2
PIF_VALUE: 26
PIF_VALUE: 18
PIF_VALUE: 25
PIF_VALUE: 27
PIF_VALUE: 27
PIF_VALUE: 25
PIF_VALUE: 26
PIF_VALUE: 24
PIF_VALUE: 26
PIF_VALUE: 26
PIF_VALUE: 25
PIF_VALUE: 25
PIF_VALUE: 24
PIF_VALUE: 25
PIF_VALUE: 2
PIF_VALUE: 26
PIF_VALUE: 2
PIF_VALUE: 2
PIF_VALUE: 26
PIF_VALUE: 17
PIF_VALUE: 26
PIF_VALUE: 16
PIF_VALUE: 26
PIF_VALUE: 25
PIF_VALUE: 28
PIF_VALUE: 25
PIF_VALUE: 26
PIF_VALUE: 26
PIF_VALUE: 24
PIF_VALUE: 2
PIF_VALUE: 26
PIF_VALUE: 25
PIF_VALUE: 23
PIF_VALUE: 22
PIF_VALUE: 4
PIF_VALUE: 26
PIF_VALUE: 25
PIF_VALUE: 26
PIF_VALUE: 23
PIF_VALUE: 26
PIF_VALUE: 2
PIF_VALUE: 27
PIF_VALUE: 25
PIF_VALUE: 7
PIF_VALUE: 28
PIF_VALUE: 25
PIF_VALUE: 22
PIF_VALUE: 26
PIF_VALUE: 2
PIF_VALUE: 25
PIF_VALUE: 17
PIF_VALUE: 25
PIF_VALUE: 25
PIF_VALUE: 26
PIF_VALUE: 26
PIF_VALUE: 7
PIF_VALUE: 25
PIF_VALUE: 25
PIF_VALUE: 26
PIF_VALUE: 25
PIF_VALUE: 19
PIF_VALUE: 3
PIF_VALUE: 26
PIF_VALUE: 25
PIF_VALUE: 23
PIF_VALUE: 2
PIF_VALUE: 25
PIF_VALUE: 5
PIF_VALUE: 23
PIF_VALUE: 26
PIF_VALUE: 2

## 2020-02-24 ASSESSMENT — PAIN - FUNCTIONAL ASSESSMENT: PAIN_FUNCTIONAL_ASSESSMENT: 0-10

## 2020-02-24 NOTE — ANESTHESIA PRE PROCEDURE
Department of Anesthesiology  Preprocedure Note       Name:  Domingo Lynch   Age:  75 y.o.  :  1945                                          MRN:  [id number]         Date:  2020      Surgeon: Glen Alvarez):  Sarah Coreas DPM    Procedure: HAMMER TOE  REPAIR 1THRU 5 RIGHT FOOT (Right )    Medications prior to admission:   Prior to Admission medications    Medication Sig Start Date End Date Taking?  Authorizing Provider   diclofenac (VOLTAREN) 75 MG EC tablet Take 75 mg by mouth daily Contact Dr Daren Eaton re: preop instructions  LD 20   Yes Historical Provider, MD   rivaroxaban (XARELTO) 10 MG TABS tablet Take 1 tablet by mouth daily (with breakfast)  Patient taking differently: Take 10 mg by mouth daily (with breakfast) LD 20   Michelle Maradiaga DPM   OXcarbazepine (TRILEPTAL) 300 MG tablet Take 300 mg by mouth 2 times daily Take am dos    Historical Provider, MD   hydrochlorothiazide (HYDRODIURIL) 25 MG tablet Take 25 mg by mouth daily    Historical Provider, MD   losartan (COZAAR) 100 MG tablet Take 100 mg by mouth daily    Historical Provider, MD   ferrous sulfate 325 (65 Fe) MG tablet Take 325 mg by mouth daily (with breakfast) Ld 20    Historical Provider, MD   vitamin D 25 MCG (1000 UT) CAPS Take 1 capsule by mouth daily Ld 20    Historical Provider, MD   atorvastatin (LIPITOR) 20 MG tablet Take 20 mg by mouth daily    Historical Provider, MD   atenolol (TENORMIN) 100 MG tablet Take 100 mg by mouth 2 times daily Take am dos    Historical Provider, MD   aspirin EC 81 MG EC tablet Take 81 mg by mouth daily Contact Dr Daren Eaton re: preop instructions  LD 20    Historical Provider, MD   vitamin B-12 (CYANOCOBALAMIN) 100 MCG tablet Take 100 mcg by mouth daily Ld 20    Historical Provider, MD   b complex vitamins capsule Take 1 capsule by mouth daily Ld 20    Historical Provider, MD   metFORMIN (GLUCOPHAGE) 850 MG tablet Take 850 mg by mouth daily (with breakfast)    Historical Provider, MD   doxycycline hyclate (VIBRAMYCIN) 100 MG capsule Take 100 mg by mouth 2 times daily For right foot - previous infection - and post-op 2-6-20    Historical Provider, MD       Current medications:    No current facility-administered medications for this encounter. Allergies: Allergies   Allergen Reactions    Lisinopril      Per PCP OV; Pt denies       Problem List:  There is no problem list on file for this patient.       Past Medical History:        Diagnosis Date    Diabetes mellitus ([facility]     DJD (degenerative joint disease)     ED (erectile dysfunction)     Hammer toe     bilat feet- for right OR 2-24-20     History of kidney stones     Hyperlipidemia     Hypertension     Hyperthyroidism     Seizure ([address]     right hand will shake uncontrollably ; last seizure 09/2019    Wears glasses     reading    Wears hearing aid     bilateral       Past Surgical History:        Procedure Laterality Date    COLONOSCOPY      FOOT DEBRIDEMENT Bilateral 2/6/2020    INCISION AND DRAINAGE WITH BONE DEBRIDEMENT X3 TOES 2 AND 5 ON THE RIGHT THIRD TOE ON THE LEFT performed by Natalie Ortiz DPM at [address] [place] [address] History:    Social History     Tobacco Use    Smoking status: Former Smoker     Packs/day: 0.25     Years: 14.00     Pack years: 3.50     Types: Cigarettes    Smokeless tobacco: Never Used   Substance Use Topics    Alcohol use: Yes     Comment: 2-3 beers per year                                Counseling given: Not Answered      Vital Signs (Current):   Vitals:    02/20/20 1047 02/24/20 1022   BP:  (!) 142/82   Pulse:  53   Resp:  18   Temp:  97.2 °F (36.2 °C)   TempSrc:  Temporal   SpO2:  97%   Weight: 281 lb (127.5 kg) 281 lb (127.5 kg)   Height: 5' 8\" (1.727 m) 5' 8\" (1.727 m)                                              BP Readings from Last 3 Encounters:   02/24/20 (!) 142/82   02/06/20 (!) 137/57   02/06/20 (!) 188/82       NPO Status: Time of last liquid consumption: 1730                        Time of last solid consumption: 1730                        Date of last liquid consumption: 02/23/20                        Date of last solid food consumption: 02/23/20    BMI:   Wt Readings from Last 3 Encounters:   02/24/20 281 lb (127.5 kg)   02/06/20 277 lb (125.6 kg)     Body mass index is 42.73 kg/m². CBC:   Lab Results   Component Value Date    WBC 8.1 02/24/2020    RBC 4.21 02/24/2020    HGB 12.8 02/24/2020    HCT 39.8 02/24/2020    MCV 94.5 02/24/2020    RDW 13.5 02/24/2020     02/24/2020       CMP:   Lab Results   Component Value Date     02/24/2020    K 4.5 02/24/2020     02/24/2020    CO2 29 02/24/2020    BUN 19 02/24/2020    CREATININE 1.0 02/24/2020    GFRAA >60 02/24/2020    LABGLOM >60 02/24/2020    GLUCOSE 120 02/24/2020    CALCIUM 9.3 02/24/2020       POC Tests: No results for input(s): POCGLU, POCNA, POCK, POCCL, POCBUN, POCHEMO, POCHCT in the last 72 hours. Coags: No results found for: PROTIME, INR, APTT    HCG (If Applicable): No results found for: PREGTESTUR, PREGSERUM, HCG, HCGQUANT     ABGs: No results found for: PHART, PO2ART, JLY3GJH, OPS6KQB, BEART, I3DGDHBP     Type & Screen (If Applicable):  No results found for: LABABO, [address]    Anesthesia Evaluation  Patient summary reviewed and Nursing notes reviewed no history of anesthetic complications:   Airway: Mallampati: IV  TM distance: >3 FB   Neck ROM: full  Mouth opening: > = 3 FB Dental:          Pulmonary:Negative Pulmonary ROS breath sounds clear to auscultation                             Cardiovascular:  Exercise tolerance: poor (<4 METS),   (+) hypertension:, hyperlipidemia        Rhythm: regular  Rate: normal                    Neuro/Psych:   (+) seizures:,             GI/Hepatic/Renal:   (+) morbid obesity (BMI: 42.8)         ROS comment: Hx of nephrolithiasis.    Endo/Other:    (+) DiabetesType II DM, , hyperthyroidism, blood dyscrasia: anticoagulation therapy, arthritis:., .                  ROS comment: Chronic osteomyelitis and ulceration Abdominal:   (+) obese,         Vascular: negative vascular ROS. Anesthesia Plan      general     ASA 3       Induction: intravenous. MIPS: Postoperative opioids intended, Prophylactic antiemetics administered and Postoperative trial extubation. Anesthetic plan and risks discussed with patient. H&P reviewed. Patient seen evaluated and examined. No interval changes in patient's medical status.         Zeeshan Bernabe MD   2/24/2020

## 2020-02-24 NOTE — BRIEF OP NOTE
Brief Postoperative Note  ______________________________________________________________    Patient: Delisa Martinez  YOB: 1945  MRN: [id number]  Date of Procedure: 2/24/2020    Pre-Op Diagnosis: Hammertoes right foot 2, 3, 4, 5. Post-Op Diagnosis: Same       Procedure(s):     1. Repair of hammertoes right foot 2, 3, 4, 5.   2. Flexor digitorum longus tendon transfer right foot digits 2, 3, 4, 5.   3. PIPJ K wire stabilization right foot digits 2, 3, 4, 5. Anesthesia: General    Surgeon(s):  MARGOT Cui DPM    Assistant: Carolina Sharpe PGY1    Estimated Blood Loss (mL): 5cc    Complications: none    Specimens:   ID Type Source Tests Collected by Time Destination   A : TENDON RIGHT FOOT Specimen Foot SURGICAL [facility] 2/24/2020 1422        Implants:  Implant Name Type Inv.  Item Serial No.  Lot No. LRB No. Used   IMPL [facility] - [id number] Screw/Plate/Nail/Josué IMPL [facility] [id number] [employer] [id number] Right 4         Drains: none    Findings: see operative report    Marlin Trujillo DPM  Date: 2/24/2020  Time: 2:50 PM

## 2020-02-24 NOTE — ANESTHESIA POSTPROCEDURE EVALUATION
Department of Anesthesiology  Postprocedure Note    Patient: Maria Elena Oro  MRN: [id number]  YOB: 1945  Date of evaluation: 2/24/2020  Time:  3:10 PM     Procedure Summary     Date:  02/24/20 Room / Location:  [facility] OR [address] [place] [address]    Anesthesia Start:  7720 Anesthesia Stop:  8220    Procedure:  REPAIR OF HAMMER TOES   2,3,4, 5 RIGHT FOOT (Right Toes) Diagnosis:  (HAMMER TOES RIGHT FOOT 1 THRU 5)    Surgeon:  Anurag Bucio DPM Responsible Provider:  Doreen Fowler MD    Anesthesia Type:  general ASA Status:  3          Anesthesia Type: general    Valentino Phase I: Valentino Score: 9    Valentino Phase II:      Last vitals: Reviewed and per EMR flowsheets.        Anesthesia Post Evaluation    Patient location during evaluation: PACU  Patient participation: complete - patient participated  Level of consciousness: awake and alert  Airway patency: patent  Nausea & Vomiting: no nausea and no vomiting  Complications: no  Cardiovascular status: hemodynamically stable  Respiratory status: acceptable  Hydration status: euvolemic

## 2020-02-25 NOTE — OP NOTE
foot.  2.  Flexion contracture of third toe, right foot. 3.  Flexion contracture of #4, right foot. 4.  Flexion contracture of #5, right foot. POSTOPERATIVE DIAGNOSES:  1. Flexion contracture of second toe, right foot. 2.  Flexion contracture of third toe, right foot. 3.  Flexion contracture of #4, right foot. 4.  Flexion contracture of #5, right foot. PROCEDURE PERFORMED:  1. Flexor digitorum longus tendon transfer, #2, right. 2.  Flexor digitorum transfer, #3, right. 3.  Flexor digitorum transfer, #4, right. 4.  Flexor digitorum transfer of the fifth toe, right. DESCRIPTION OF PROCEDURE:  The patient was seen in the preop holding  area. Appropriate site marking was performed. He and his wife agreed  to the consent, site marking, and perioperative management. He was  brought into the OR and placed well padded on the OR table where  anesthesia was achieved. Once the anesthesia was achieved, his right  foot and leg were prepped and draped in the usual sterile fashion. Hemostasis was accomplished via a mid thigh tourniquet, which was  inflated to 300 mmHg. At this time, appropriate timeout was performed  and everybody in the room concurred. PROCEDURE #1:  Flexor digitorum longus tendon transfer of #2 of the  right foot. Attention was directed to the medial aspect of the second  toe where a medial base incision was made. It was deepened in the same  plane using sharp and blunt dissection, avoiding neurovascular  structures, and carried down to the deep tissue. At this time, the deep  fascia was incised. The FDL tendon was identified and detached from the  distal phalanx. The medial slips of the flexor digitorum brevis and the  lateral slips of the flexor digitorum brevis were cut. Capsulotomy was  performed at the PIPJ. At this time, the FDL tendon was transferred to  the extensor rich under physiological tension.   A 0.062 K-wire was  inserted from the distal phalanx to the middle phalanx to the proximal  phalanx in a straight alignment, checked under fluoroscopy, and under  physiological tension, the FDL was transferred to the extensor rich. This was reattached using 3-0 Monocryl. Skin was closed using 2-0  Prolene, checked under fluoroscopy, and noted to be in good anatomical  alignment. PROCEDURE #2:  FDL transfer of the third toe. Attention was directed to  the third toe where FDL transfer was performed. Medial base incision  was made. It was deepened in the same plane using sharp and blunt  dissection avoiding neurovascular structures, carried down to the deep  tissues. At this time, the FDL tendon was detached from the distal  phalanx. Medial and lateral slips of the FDP tendon were cut. A  capsulotomy was performed at the PIPJ and at this point in time, a 0.062  K-wire was then inserted into the distal phalanx, middle phalanx, and  proximal phalanx gaining good anatomical alignment. The FDL tendon was  transferred to the extensor rich under physiological tension and this  was sutured with 3-0 Monocryl and then the skin was closed using 2-0  nylon. PROCEDURE #3:  FDL transfer #4, fourth toe. Attention was directed to  the medial aspect of the fourth toe where the incision was deepened in  the same plane using sharp and blunt dissection avoiding neurovascular  structures. At this time, it was carried down to the FDL tendon. It  was detached from the distal phalanx. Medial and lateral slips of the  FDB were cut and a capsulotomy was performed. At this time, 0.062  K-wire was inserted into the fourth toe straight and noted to be in good  anatomic alignment under physiologic tension. The FDL tendon was  transferred to the extensor rich. This was anchored with 3-0 Monocryl. Skin was closed using 2-0 nylon. PROCEDURE #4:  FDL transfer, fifth toe, right. Attention was directed  to the medial aspect of the fifth toe where an incision was made.   It  was deepened in the same plane using sharp and blunt dissection avoiding  neurovascular structures and carried down through the deep tissues where  the FDL tendon was identified, detached from the distal phalanx. The  FDB medial and lateral slips were cut and at this point in time, a 0.062  K-wire was inserted into the fifth toe under physiological tension. The  FDL was transferred to the extensor rich and this was sutured with 3-0  Monocryl. Skin was then closed using 2-0 nylon. He was anesthetized  with 30 mL of 0.5% Marcaine about the surgical site. Surgical wounds  were dressed with Betadine-soaked Adaptic, 4x4s, and Niles in a sterile  compressive fashion. Tourniquet was released. Normal vascular status  was noted to return to all the digits of the right foot. He tolerated  the procedure and anesthesia well and left the OR with vital signs  stable and neurovascular status intact. An estimation of approximately 10 cc of blood was lost during the surgery.       Chantell Martinez DPM    D: 02/24/2020 15:33:33       T: 02/24/2020 23:51:32     [email or web address]  Job#: [id number]     Doc#: [id number]    CC:

## 2020-03-09 LAB
FUNGUS (MYCOLOGY) CULTURE: NORMAL
FUNGUS STAIN: NORMAL

## 2020-03-24 ENCOUNTER — HOSPITAL ENCOUNTER (OUTPATIENT)
Age: 75
Discharge: HOME | End: 2020-03-26
Payer: MEDICARE

## 2020-03-24 LAB
AFB CULTURE (MYCOBACTERIA): NORMAL
AFB SMEAR: NORMAL
ALBUMIN SERPL-MCNC: 4 G/DL (ref 3.5–5.2)
ALP BLD-CCNC: 29 U/L (ref 40–129)
ALT SERPL-CCNC: 18 U/L (ref 0–40)
ANION GAP SERPL CALCULATED.3IONS-SCNC: 14 MMOL/L (ref 7–16)
AST SERPL-CCNC: 17 U/L (ref 0–39)
BASOPHILS ABSOLUTE: 0.04 E9/L (ref 0–0.2)
BASOPHILS RELATIVE PERCENT: 0.4 % (ref 0–2)
BILIRUB SERPL-MCNC: 0.3 MG/DL (ref 0–1.2)
BUN BLDV-MCNC: 19 MG/DL (ref 8–23)
CALCIUM SERPL-MCNC: 9.2 MG/DL (ref 8.6–10.2)
CHLORIDE BLD-SCNC: 99 MMOL/L (ref 98–107)
CO2: 25 MMOL/L (ref 22–29)
CREAT SERPL-MCNC: 1 MG/DL (ref 0.7–1.2)
EOSINOPHILS ABSOLUTE: 0.13 E9/L (ref 0.05–0.5)
EOSINOPHILS RELATIVE PERCENT: 1.2 % (ref 0–6)
GFR AFRICAN AMERICAN: >60
GFR NON-AFRICAN AMERICAN: >60 ML/MIN/1.73
GLUCOSE BLD-MCNC: 161 MG/DL (ref 74–99)
HCT VFR BLD CALC: 37.6 % (ref 37–54)
HEMOGLOBIN: 12.1 G/DL (ref 12.5–16.5)
IMMATURE GRANULOCYTES #: 0.02 E9/L
IMMATURE GRANULOCYTES %: 0.2 % (ref 0–5)
LYMPHOCYTES ABSOLUTE: 0.95 E9/L (ref 1.5–4)
LYMPHOCYTES RELATIVE PERCENT: 8.7 % (ref 20–42)
MCH RBC QN AUTO: 29.4 PG (ref 26–35)
MCHC RBC AUTO-ENTMCNC: 32.2 % (ref 32–34.5)
MCV RBC AUTO: 91.3 FL (ref 80–99.9)
MONOCYTES ABSOLUTE: 1.01 E9/L (ref 0.1–0.95)
MONOCYTES RELATIVE PERCENT: 9.2 % (ref 2–12)
NEUTROPHILS ABSOLUTE: 8.79 E9/L (ref 1.8–7.3)
NEUTROPHILS RELATIVE PERCENT: 80.3 % (ref 43–80)
PDW BLD-RTO: 13 FL (ref 11.5–15)
PLATELET # BLD: 233 E9/L (ref 130–450)
PMV BLD AUTO: 10.4 FL (ref 7–12)
POTASSIUM SERPL-SCNC: 4.4 MMOL/L (ref 3.5–5)
RBC # BLD: 4.12 E12/L (ref 3.8–5.8)
SEDIMENTATION RATE, ERYTHROCYTE: 53 MM/HR (ref 0–15)
SODIUM BLD-SCNC: 138 MMOL/L (ref 132–146)
TOTAL PROTEIN: 7.6 G/DL (ref 6.4–8.3)
WBC # BLD: 10.9 E9/L (ref 4.5–11.5)

## 2020-03-24 PROCEDURE — 80053 COMPREHEN METABOLIC PANEL: CPT

## 2020-03-24 PROCEDURE — 85651 RBC SED RATE NONAUTOMATED: CPT

## 2020-03-24 PROCEDURE — 87075 CULTR BACTERIA EXCEPT BLOOD: CPT

## 2020-03-24 PROCEDURE — 85025 COMPLETE CBC W/AUTO DIFF WBC: CPT

## 2020-03-24 PROCEDURE — 87205 SMEAR GRAM STAIN: CPT

## 2020-03-24 PROCEDURE — 87070 CULTURE OTHR SPECIMN AEROBIC: CPT

## 2020-03-25 LAB — GRAM STAIN ORDERABLE: NORMAL

## 2020-03-27 LAB — WOUND/ABSCESS: NORMAL

## 2020-03-29 LAB — ANAEROBIC CULTURE: NORMAL

## (undated) DEVICE — NEEDLE HYPO 18GA L1.5IN PNK S STL HUB POLYPR SHLD REG BVL

## (undated) DEVICE — PADDING CAST W6INXL4YD COT LO LINTING WYTEX

## (undated) DEVICE — ELECTRODE PT RET AD L9FT HI MOIST COND ADH HYDRGEL CORDED

## (undated) DEVICE — CHLORAPREP 26ML ORANGE

## (undated) DEVICE — DRESSING PETRO W3XL8IN OIL EMUL N ADH GZ KNIT IMPREG CELOS

## (undated) DEVICE — GAUZE,SPONGE,4"X4",8PLY,STRL,LF,10/TRAY: Brand: MEDLINE

## (undated) DEVICE — DRAPE,EXTREMITY,89X128,STERILE: Brand: MEDLINE

## (undated) DEVICE — TAPE ADH W0.5INXL10YD CLTH SILK H2O RESIST CURAD

## (undated) DEVICE — COVER HNDL LT DISP

## (undated) DEVICE — DRAPE C ARM W41XL74IN UNIV MOB W RUBBERBAND CLP

## (undated) DEVICE — INTENDED FOR TISSUE SEPARATION, AND OTHER PROCEDURES THAT REQUIRE A SHARP SURGICAL BLADE TO PUNCTURE OR CUT.: Brand: BARD-PARKER ® STAINLESS STEEL BLADES

## (undated) DEVICE — HOSE CONN FOR WST MGMT SYS NEPTUNE 2

## (undated) DEVICE — DOUBLE BASIN SET: Brand: MEDLINE INDUSTRIES, INC.

## (undated) DEVICE — NEEDLE JAMSH BNE MAR 13G X 2

## (undated) DEVICE — PACK,UNIV, II AURORA: Brand: MEDLINE

## (undated) DEVICE — DRILL MINI CORDLESS

## (undated) DEVICE — DRESSING FOAM W22XL25CM FILVE LAYR FOAM DP DEF SAFETAC

## (undated) DEVICE — TUBING SUCT 12FR MAL ALUM SHFT FN CAP VENT UNIV CONN W/ OBT

## (undated) DEVICE — SUTURE VCRL + SZ 3-0 L18IN ABSRB UD PS-1 L24MM 3/8 CIR PRIM VCP683G

## (undated) DEVICE — BANDAGE,GAUZE,BULKEE II,4.5"X4.1YD,STRL: Brand: MEDLINE

## (undated) DEVICE — GOWN,SIRUS,FABRNF,XL,20/CS: Brand: MEDLINE

## (undated) DEVICE — BANDAGE COMPR W6INXL10YD ST M E WHITE/BEIGE

## (undated) DEVICE — ZIMMER® STERILE DISPOSABLE TOURNIQUET CUFF WITH PLC, DUAL PORT, SINGLE BLADDER, 34 IN. (86 CM)

## (undated) DEVICE — NEEDLE HYPO 22GA L1.5IN BLK POLYPR HUB S STL REG BVL STR

## (undated) DEVICE — 4-PORT MANIFOLD: Brand: NEPTUNE 2

## (undated) DEVICE — K WIRE FIX L6IN DIA0.062IN 1600662] MICROAIRE SURGICAL INSTRUMENTS INC]

## (undated) DEVICE — PADDING,UNDERCAST,COTTON, 4"X4YD STERILE: Brand: MEDLINE

## (undated) DEVICE — SOLUTION IV IRRIG POUR BRL 0.9% SODIUM CHL 2F7124

## (undated) DEVICE — BNDG,ELSTC,MATRIX,STRL,6"X5YD,LF,HOOK&LP: Brand: MEDLINE

## (undated) DEVICE — DRAPE, EXTREMITY, BILATERAL, STERILE: Brand: MEDLINE

## (undated) DEVICE — TOWEL,OR,DSP,ST,BLUE,STD,6/PK,12PK/CS: Brand: MEDLINE

## (undated) DEVICE — PACK PROCEDURE SURG GEN CUST

## (undated) DEVICE — PAD,ABDOMINAL,5"X9",ST,LF,25/BX: Brand: MEDLINE INDUSTRIES, INC.

## (undated) DEVICE — NEEDLE HYPO 21GA L2IN GRN POLYPR HUB S STL REG BVL STR W/O

## (undated) DEVICE — TRAP SPEC MUCUS FOR SUCT

## (undated) DEVICE — CLAMP ALLIS REG

## (undated) DEVICE — STERILE HOOK LOCK LATEX FREE ELASTIC BANDAGE 4INX5YD: Brand: HOOK LOCK™

## (undated) DEVICE — SYRINGE MED 10ML TRNSLUC BRL PLUNG BLK MRK POLYPR CTRL

## (undated) DEVICE — GOWN,SIRUS,FABRNF,L,20/CS: Brand: MEDLINE

## (undated) DEVICE — BNDG,ELSTC,MATRIX,STRL,4"X5YD,LF,HOOK&LP: Brand: MEDLINE